# Patient Record
Sex: MALE | Race: WHITE | Employment: FULL TIME | URBAN - METROPOLITAN AREA
[De-identification: names, ages, dates, MRNs, and addresses within clinical notes are randomized per-mention and may not be internally consistent; named-entity substitution may affect disease eponyms.]

---

## 2017-05-02 ENCOUNTER — OFFICE VISIT (OUTPATIENT)
Dept: FAMILY MEDICINE CLINIC | Age: 47
End: 2017-05-02
Payer: COMMERCIAL

## 2017-05-02 VITALS — TEMPERATURE: 97.8 F | SYSTOLIC BLOOD PRESSURE: 136 MMHG | DIASTOLIC BLOOD PRESSURE: 84 MMHG

## 2017-05-02 DIAGNOSIS — J02.8 ACUTE PHARYNGITIS DUE TO OTHER SPECIFIED ORGANISMS: Primary | ICD-10-CM

## 2017-05-02 PROCEDURE — 99213 OFFICE O/P EST LOW 20 MIN: CPT | Performed by: FAMILY MEDICINE

## 2017-05-02 RX ORDER — AMOXICILLIN 500 MG/1
500 TABLET, FILM COATED ORAL 3 TIMES DAILY
Qty: 21 TABLET | Refills: 0 | Status: SHIPPED | OUTPATIENT
Start: 2017-05-02 | End: 2017-05-09

## 2017-05-02 ASSESSMENT — ENCOUNTER SYMPTOMS
COUGH: 0
SORE THROAT: 1
VOMITING: 0
NAUSEA: 0

## 2017-12-09 ENCOUNTER — HOSPITAL ENCOUNTER (EMERGENCY)
Age: 47
Discharge: HOME | End: 2017-12-09
Attending: FAMILY MEDICINE
Payer: COMMERCIAL

## 2017-12-09 ENCOUNTER — APPOINTMENT (OUTPATIENT)
Dept: GENERAL RADIOLOGY | Age: 47
End: 2017-12-09
Payer: COMMERCIAL

## 2017-12-09 VITALS
HEIGHT: 74 IN | DIASTOLIC BLOOD PRESSURE: 97 MMHG | BODY MASS INDEX: 33.75 KG/M2 | HEART RATE: 80 BPM | WEIGHT: 263 LBS | TEMPERATURE: 97.9 F | RESPIRATION RATE: 16 BRPM | OXYGEN SATURATION: 100 % | SYSTOLIC BLOOD PRESSURE: 175 MMHG

## 2017-12-09 DIAGNOSIS — T07.XXXA ABRASIONS OF MULTIPLE SITES: ICD-10-CM

## 2017-12-09 DIAGNOSIS — S43.101A AC SEPARATION, TYPE 3, RIGHT, INITIAL ENCOUNTER: Primary | ICD-10-CM

## 2017-12-09 PROCEDURE — 73030 X-RAY EXAM OF SHOULDER: CPT

## 2017-12-09 PROCEDURE — 99284 EMERGENCY DEPT VISIT MOD MDM: CPT

## 2017-12-09 RX ORDER — OXYCODONE HYDROCHLORIDE AND ACETAMINOPHEN 5; 325 MG/1; MG/1
1 TABLET ORAL EVERY 6 HOURS PRN
Qty: 10 TABLET | Refills: 0 | Status: SHIPPED | OUTPATIENT
Start: 2017-12-09 | End: 2017-12-16

## 2017-12-09 ASSESSMENT — PAIN DESCRIPTION - DESCRIPTORS: DESCRIPTORS: ACHING

## 2017-12-09 ASSESSMENT — PAIN DESCRIPTION - FREQUENCY: FREQUENCY: CONTINUOUS

## 2017-12-09 ASSESSMENT — PAIN SCALES - GENERAL
PAINLEVEL_OUTOF10: 4
PAINLEVEL_OUTOF10: 2

## 2017-12-09 ASSESSMENT — PAIN DESCRIPTION - LOCATION: LOCATION: SHOULDER

## 2017-12-09 ASSESSMENT — PAIN DESCRIPTION - ORIENTATION: ORIENTATION: RIGHT

## 2017-12-09 ASSESSMENT — PAIN DESCRIPTION - PAIN TYPE: TYPE: ACUTE PAIN

## 2017-12-09 NOTE — ED PROVIDER NOTES
eMERGENCY dEPARTMENT eNCOUnter        [address] [facility]    Chief Complaint   Patient presents with    Fall     8 steps    Facial Laceration     left eye area    Shoulder Injury     right states \"I think its out\"       HPI    Jerry Prajapati is a 50 y.o. male who presents With pain to his right shoulder and abrasions on his face and scalp after falling down 8 steps today. Patient describes pain in right shoulder is moderate in severity. There was no loss of consciousness. REVIEW OF SYSTEMS    All systems reviewed and positives are in the history of present illness. PAST MEDICAL HISTORY    Past Medical History:   Diagnosis Date    GERD (gastroesophageal reflux disease)     diverticulitis (2010)       SURGICAL HISTORY    Past Surgical History:   Procedure Laterality Date    COLONOSCOPY  10/10/14       CURRENT MEDICATIONS    Current Outpatient Rx   Medication Sig Dispense Refill    oxyCODONE-acetaminophen (PERCOCET) 5-325 MG per tablet Take 1 tablet by mouth every 6 hours as needed for Pain .  10 tablet 0       ALLERGIES    No Known Allergies    FAMILY HISTORY    Family History   Problem Relation Age of Onset    Hearing Loss Father     High Blood Pressure Father     Cancer Maternal Grandmother      colon    Cancer Paternal Grandmother      colon       SOCIAL HISTORY    Social History     Social History    Marital status: Single     Spouse name: N/A    Number of children: N/A    Years of education: N/A     Social History Main Topics    Smoking status: Never Smoker    Smokeless tobacco: Never Used    Alcohol use 0.0 oz/week     3 - 4 Cans of beer per week    Drug use: No    Sexual activity: Not Asked     Other Topics Concern    None     Social History Narrative    ** Merged History Encounter **            PHYSICAL EXAM    VITAL SIGNS: BP (!) 175/97   Pulse 80   Temp 97.9 °F (36.6 °C) (Oral)   Resp 16   Ht 6' 2\" (1.88 m)   Wt 263 lb (119.3 kg)   SpO2 100%   BMI 33.77 kg/m² 70 [facility] [address]    Call in 2 days          Final Impression:   1. AC separation, type 3, right, initial encounter    2. Abrasions of multiple sites               (Please note that portions of this note were completed with a voice recognition program.  Efforts were made to edit the dictations but occasionally words are mis-transcribed.)    2.          Juan Luis Pratt MD  12/09/17 [id number]

## 2017-12-09 NOTE — ED NOTES
Pt having difficulty finding position of comfort with sling.   Adjustments made and MD notified     Brianne Kelly, RN  12/09/17 [id number]

## 2018-01-24 ENCOUNTER — HOSPITAL ENCOUNTER (OUTPATIENT)
Age: 48
Discharge: HOME | End: 2018-01-24
Payer: COMMERCIAL

## 2018-01-24 ENCOUNTER — HOSPITAL ENCOUNTER (OUTPATIENT)
Dept: GENERAL RADIOLOGY | Age: 48
Discharge: HOME | End: 2018-01-24
Payer: COMMERCIAL

## 2018-01-24 DIAGNOSIS — S43.101S: ICD-10-CM

## 2018-01-24 PROCEDURE — 73030 X-RAY EXAM OF SHOULDER: CPT

## 2018-02-14 ENCOUNTER — HOSPITAL ENCOUNTER (OUTPATIENT)
Age: 48
Discharge: HOME | End: 2018-02-16
Payer: COMMERCIAL

## 2018-02-14 ENCOUNTER — HOSPITAL ENCOUNTER (OUTPATIENT)
Dept: GENERAL RADIOLOGY | Age: 48
Discharge: HOME | End: 2018-02-16
Payer: COMMERCIAL

## 2018-02-14 DIAGNOSIS — S43.51XD ACROMIOCLAVICULAR SPRAIN, RIGHT, SUBSEQUENT ENCOUNTER: ICD-10-CM

## 2018-02-14 PROCEDURE — 73030 X-RAY EXAM OF SHOULDER: CPT

## 2018-02-21 ENCOUNTER — HOSPITAL ENCOUNTER (OUTPATIENT)
Dept: PHYSICAL THERAPY | Age: 48
Setting detail: THERAPIES SERIES
Discharge: HOME | End: 2018-02-21
Payer: COMMERCIAL

## 2018-02-21 PROCEDURE — 97161 PT EVAL LOW COMPLEX 20 MIN: CPT

## 2018-02-21 NOTE — PRE-CERTIFICATION NOTE
F U N C T I O N A L   P R O G R E S S   C H A R T    Member: Franco Hall II    Member ID #    Diagnosis: Right Shoulder [facility] Joint Reconstruction 1/9/18 Insurance Company:   Referring Practitioner: Dr. Jing Saeed  Referring Physican ID#   Therapy Office:[facility]  Date fo Birth / Age: 1970/ 50 y.o.     [] Clinical Update Treating Clinician: Cristal Stark  ICD-9 (s):   Discipline:  []PT / []OT  Diagnosis:  [] Additional  Involved Side:  []Left / [] Right / [] N/A Date of Injury:  __/__/___       Visits Request       Date of Surgery:  __/__/___       Date:   2/21/2018 Date:   Total Number of Visits To Date Total # of Visits to Date: 1  (Total Requested: 6)    Pain Scale      /10 5/10 intermittent    Gait     AROM      AROM RUE (degrees)  R Shoulder Flexion 0-180: 106deg  R Shoulder ABduction 0-180: 85deg  R Shoulder Int Rotation  0-70: @45deg ABD=to stomach  R Shoulder Ext Rotation 0-90: @45 deg ABD=45deg                                        Strength                   Strength RUE  R Shoulder Flexion: 2+/5  R Shoulder ABduction: 2+/5  R Shoulder External Rotation: 2+/5  R Shoulder Horizontal ABduction: 2+/5          Proprioceptive/ Neurological Deficits     Functional Limitations / Additional Comments           Exam: UEFS=57/80      Copyright 2012 [employer]      Created: 9/99 / Revised 1/12

## 2018-02-21 NOTE — PLAN OF CARE
[facility]       Phone: [phone]   Date: 2018                      Outpatient Physical Therapy  Fax: [phone]    ACCT #: [de-identified]                     Plan of Care  [facility]#: [id number]  Patient: Marylene Brome II  : 1970    Referring Practitioner: Dr. Maggi Corrales    Referral Date : 18    Diagnosis: Right Shoulder AC Joint Reconstruction 18  Onset Date: 18  Treatment Diagnosis: R shoulder pain s/p [facility] reconstruction    Assessment  Body structures, Functions, Activity limitations: Decreased functional mobility , Decreased ADL status, Decreased ROM, Decreased strength, Decreased high-level IADLs, Decreased endurance  Assessment: Pt to benefit from ther ex for ROM and progress to strengthening. Pt current AROM: Flex=106deg, ABD=85deg, [email or web address] Pt issued HEP for AAROM, will follow up with pt on 3/6/18 to progress HEP. Prognosis: Good    Treatment Plan   Days: 1 times per week Weeks: 6 weeks Total # of Visits Approved: 6  [] HP/CP     [] Electrical Stimulation   [x]  Therapeutic Exercise   []  Gait Training  [] Traction   [] Ultrasound                   []  Massage                        []  Work Conditioning   [] Aquatics  [] Back Education            []  Therapeutic Activity [x]  Manual Therapy  [x]  Patient Education/HEP []  Anodyne Therapy     Goals  Short term goals  Time Frame for Short term goals: 3 visits  Short term goal 1: Pt to report independence and compliance with HEP. Short term goal 2: Pt to have 60deg of ER to improve pt ability to guerline coat. Long term goals  Time Frame for Long term goals : 6 visits  Long term goal 1: Pt to have AROM [id number] to allow pt to reach items from the cupboard without discomfort  Long term goal 2: Pt to score >67/80 on UEFS to improve ADL lo and work lo. Long term goal 3: Pt to complete 30# crate lift floor to waist to ensure pt able to lo work duties without difficulty.   Long term goal 4: Pt to      Guillermo Vieyra, PT     Date: 2/21/2018    ______________________________________ Date: 2/21/2018  Physician Signature  By signing above or cosigning electronically, I have reviewed this [address] [place] [address] and certify a need for medically necessary rehabilitation services.

## 2018-02-21 NOTE — PROGRESS NOTES
status, Decreased ROM, Decreased strength, Decreased high-level IADLs, Decreased endurance  Assessment: Pt to benefit from ther ex for ROM and progress to strengthening. Pt current AROM: [id number], ABD=85deg, Dennis@"[employer]. Pt issued HEP for SUKUMAR, will follow up with pt on 3/6/18 to progress HEP. Prognosis: Good  Decision Making: Low Complexity  Exam: UEFS=57/80    Clinical Presentation:  [x] Stable/Uncomplicated  [] Evolving [] Unstable/Unpredictable  The Following Comorbities will impact the patients progression and Plan of Care:   [] Diabetes    [] Stroke  [] Cardiac Disease/Pacemaker [x] Previous Orthopedic Injury/Surgery  [] Seizure Disorder   [] WB Restrictions  [] Obesity    [] Neuropathy     Activity Tolerance: Patient Tolerated treatment well    Education:   Patient Education: POC; HEP per Exercise Log  Barriers to Learning: None    Goals  Short term goals  Time Frame for Short term goals: 3 visits  Short term goal 1: Pt to report independence and compliance with HEP. Short term goal 2: Pt to have 60deg of ER to improve pt ability to guerline coat. Long term goals  Time Frame for Long term goals : 6 visits  Long term goal 1: Pt to have AROM [id number] to allow pt to reach items from the cupboard without discomfort  Long term goal 2: Pt to score >67/80 on UEFS to improve ADL lo and work lo. Long term goal 3: Pt to complete 30# crate lift floor to waist to ensure pt able to lo work duties without difficulty. Long term goal 4: Pt to       Patient goals : Be able to return to work and recreational activities.     Timed Code Treatment Minutes: 15 Minutes  Total Treatment Time: 55  Time In: 6123  Time Out: 100 Venus Bailey  2/21/2018

## 2020-01-31 ENCOUNTER — NURSE ONLY (OUTPATIENT)
Dept: PRIMARY CARE CLINIC | Age: 50
End: 2020-01-31
Payer: COMMERCIAL

## 2020-01-31 PROCEDURE — 90715 TDAP VACCINE 7 YRS/> IM: CPT | Performed by: NURSE PRACTITIONER

## 2020-01-31 PROCEDURE — 90472 IMMUNIZATION ADMIN EACH ADD: CPT | Performed by: NURSE PRACTITIONER

## 2020-01-31 PROCEDURE — 90471 IMMUNIZATION ADMIN: CPT | Performed by: NURSE PRACTITIONER

## 2020-01-31 PROCEDURE — 90686 IIV4 VACC NO PRSV 0.5 ML IM: CPT | Performed by: NURSE PRACTITIONER

## 2020-01-31 NOTE — PROGRESS NOTES
After obtaining consent, and per orders of Yi REED, injection of Boostrix given in Right deltoid by Fawn Sadler. Patient instructed to remain in clinic for 20 minutes afterwards, and to report any adverse reaction to me immediately. Vaccine Information Sheet, \"Influenza - Inactivated\"  given to Virginie Shook, or parent/legal guardian of  Virginie Shook and verbalized understanding. Patient responses:    Have you ever had a reaction to a flu vaccine? Patient states he got sick after getting a flu shot many years ago. Are you able to eat eggs without adverse effects? Yes  Do you have any current illness? No  Have you ever had Guillian Laddonia Syndrome? No    Flu vaccine given per order. Please see immunization tab.

## 2022-01-19 ENCOUNTER — HOSPITAL ENCOUNTER (OUTPATIENT)
Dept: PREADMISSION TESTING | Age: 52
Setting detail: SPECIMEN
Discharge: HOME | End: 2022-01-19
Payer: COMMERCIAL

## 2022-01-19 ENCOUNTER — TELEPHONE (OUTPATIENT)
Dept: FAMILY MEDICINE CLINIC | Age: 52
End: 2022-01-19

## 2022-01-19 DIAGNOSIS — Z20.822 SUSPECTED COVID-19 VIRUS INFECTION: ICD-10-CM

## 2022-01-19 DIAGNOSIS — Z20.822 SUSPECTED COVID-19 VIRUS INFECTION: Primary | ICD-10-CM

## 2022-01-19 LAB
SARS-COV-2, RAPID: NOT DETECTED
SPECIMEN DESCRIPTION: NORMAL

## 2022-01-19 PROCEDURE — C9803 HOPD COVID-19 SPEC COLLECT: HCPCS

## 2022-01-19 PROCEDURE — 87635 SARS-COV-2 COVID-19 AMP PRB: CPT

## 2022-01-19 NOTE — TELEPHONE ENCOUNTER
Yuri Delaney is c/o having a bad sore throat and body aches. He said he has had this for a couple days. He has had both COVID shots not the booster. Can he get tested? Health Maintenance   Topic Date Due    Hepatitis C screen  Never done    COVID-19 Vaccine (1) Never done    Depression Screen  Never done    HIV screen  Never done    Lipid screen  Never done    Colon cancer screen colonoscopy  Never done    Shingles Vaccine (1 of 2) Never done    Flu vaccine (1) 09/01/2021    DTaP/Tdap/Td vaccine (3 - Td or Tdap) 01/31/2030    Hepatitis A vaccine  Aged Out    Hepatitis B vaccine  Aged Out    Hib vaccine  Aged Out    Meningococcal (ACWY) vaccine  Aged Out    Pneumococcal 0-64 years Vaccine  Aged Out             (applicable per patient's age: Cancer Screenings, Depression Screening, Fall Risk Screening, Immunizations)    AST (U/L)   Date Value   05/08/2013 42 (H)     ALT (U/L)   Date Value   05/08/2013 95 (H)     BUN (mg/dL)   Date Value   05/08/2013 26 (H)      (goal A1C is < 7)   (goal LDL is <100) need 30-50% reduction from baseline     BP Readings from Last 3 Encounters:   12/09/17 (!) 175/97   05/02/17 136/84   11/08/16 134/84    (goal /80)      All Future Testing planned in [facility]:  Lab Frequency Next Occurrence       Next Visit Date:  No future appointments.          Patient Active Problem List:     Closed C1 fracture (HCC)     GERD (gastroesophageal reflux disease)     Neck pain on left side     Headache due to trauma     C1 cervical fracture (HCC)     Pain in neck     Weakness

## 2022-01-20 ENCOUNTER — OFFICE VISIT (OUTPATIENT)
Dept: FAMILY MEDICINE CLINIC | Age: 52
End: 2022-01-20
Payer: COMMERCIAL

## 2022-01-20 VITALS
SYSTOLIC BLOOD PRESSURE: 150 MMHG | DIASTOLIC BLOOD PRESSURE: 92 MMHG | BODY MASS INDEX: 35.68 KG/M2 | TEMPERATURE: 99.1 F | HEIGHT: 74 IN | WEIGHT: 278 LBS

## 2022-01-20 DIAGNOSIS — J03.80 ACUTE TONSILLITIS DUE TO OTHER SPECIFIED ORGANISMS: Primary | ICD-10-CM

## 2022-01-20 PROCEDURE — 99213 OFFICE O/P EST LOW 20 MIN: CPT | Performed by: FAMILY MEDICINE

## 2022-01-20 RX ORDER — AMOXICILLIN 500 MG/1
500 CAPSULE ORAL 3 TIMES DAILY
Qty: 21 CAPSULE | Refills: 0 | Status: SHIPPED | OUTPATIENT
Start: 2022-01-20 | End: 2022-02-02 | Stop reason: SDUPTHER

## 2022-01-20 SDOH — ECONOMIC STABILITY: FOOD INSECURITY: WITHIN THE PAST 12 MONTHS, THE FOOD YOU BOUGHT JUST DIDN'T LAST AND YOU DIDN'T HAVE MONEY TO GET MORE.: NEVER TRUE

## 2022-01-20 SDOH — ECONOMIC STABILITY: FOOD INSECURITY: WITHIN THE PAST 12 MONTHS, YOU WORRIED THAT YOUR FOOD WOULD RUN OUT BEFORE YOU GOT MONEY TO BUY MORE.: NEVER TRUE

## 2022-01-20 ASSESSMENT — ENCOUNTER SYMPTOMS
SWOLLEN GLANDS: 1
EYE REDNESS: 0
COUGH: 0
SORE THROAT: 1
NAUSEA: 0
VOMITING: 0
DIARRHEA: 0
EYE DISCHARGE: 0

## 2022-01-20 ASSESSMENT — PATIENT HEALTH QUESTIONNAIRE - PHQ9
2. FEELING DOWN, DEPRESSED OR HOPELESS: 0
SUM OF ALL RESPONSES TO PHQ QUESTIONS 1-9: 0
SUM OF ALL RESPONSES TO PHQ9 QUESTIONS 1 & 2: 0
SUM OF ALL RESPONSES TO PHQ QUESTIONS 1-9: 0
1. LITTLE INTEREST OR PLEASURE IN DOING THINGS: 0

## 2022-01-20 ASSESSMENT — SOCIAL DETERMINANTS OF HEALTH (SDOH): HOW HARD IS IT FOR YOU TO PAY FOR THE VERY BASICS LIKE FOOD, HOUSING, MEDICAL CARE, AND HEATING?: NOT HARD AT ALL

## 2022-01-20 NOTE — PROGRESS NOTES
HPI Notes    Name: Kerry Hurtado  : 1970        Chief Complaint:     Chief Complaint   Patient presents with    Pharyngitis     Pt c/o sore throat over the past couple days. Pt negative for covid yesterday. Pt taking Mucinex for c/o sore throat. History of Present Illness:     Kerry Hurtado is a 46 y.o.  male who presents with Pharyngitis (Pt c/o sore throat over the past couple days. Pt negative for covid yesterday. Pt taking Mucinex for c/o sore throat.)      Pharyngitis  This is a new problem. Episode onset: started 2d ago with sore throat and the throat is worse today. Pt had low grade temp. pt feel like his throat is strep throat that he has had before. The problem occurs daily (pt states he had a negative COVID test here yesterday. ). The problem has been gradually worsening. Associated symptoms include congestion, a fever, a sore throat and swollen glands. Pertinent negatives include no chills, coughing, myalgias, nausea, rash or vomiting. Associated symptoms comments: No ear pain. Treatments tried: alleve and mucinex last night. Past Medical History:     Past Medical History:   Diagnosis Date    GERD (gastroesophageal reflux disease)     diverticulitis ()      Reviewed all health maintenance requirements and ordered appropriate tests  Health Maintenance Due   Topic Date Due    Hepatitis C screen  Never done    Depression Screen  Never done    HIV screen  Never done    Diabetes screen  Never done    Lipid screen  Never done    Colon cancer screen colonoscopy  Never done    Shingles Vaccine (1 of 2) Never done    Flu vaccine (1) 2021    COVID-19 Vaccine (3 - Booster for [facility] series) 2021       Past Surgical History:     Past Surgical History:   Procedure Laterality Date    COLONOSCOPY  10/10/14        Medications:       Prior to Admission medications    Medication Sig Start Date End Date Taking?  Authorizing Provider   amoxicillin (AMOXIL) 500 MG capsule Take 1 capsule by mouth 3 times daily for 7 days 1/20/22 1/27/22 Yes Lorene Garg MD        Allergies:       Patient has no known allergies. Social History:     Tobacco:    reports that he has never smoked. He has never used smokeless tobacco.  Alcohol:      reports current alcohol use. Drug Use:  reports no history of drug use. Family History:     Family History   Problem Relation Age of Onset    Hearing Loss Father     High Blood Pressure Father     Cancer Maternal Grandmother         colon    Cancer Paternal Grandmother         colon       Review of Systems:       Review of Systems   Constitutional: Positive for fever. Negative for chills. HENT: Positive for congestion and sore throat. Eyes: Negative for discharge and redness. Respiratory: Negative for cough. Gastrointestinal: Negative for diarrhea, nausea and vomiting. Musculoskeletal: Negative for myalgias. Skin: Negative for rash. Physical Exam:     Physical Exam  Vitals reviewed. Constitutional:       General: He is not in acute distress. Appearance: Normal appearance. He is not ill-appearing. HENT:      Head: Normocephalic and atraumatic. Right Ear: Tympanic membrane normal.      Left Ear: Tympanic membrane normal.      Mouth/Throat:      Pharynx: Pharyngeal swelling and posterior oropharyngeal erythema present. No oropharyngeal exudate. Tonsils: No tonsillar abscesses. 2+ on the right. 2+ on the left. Eyes:      General:         Right eye: No discharge. Left eye: No discharge. Lymphadenopathy:      Cervical: Cervical adenopathy present. Neurological:      Mental Status: He is alert.          Vitals:  BP (!) 150/92 (Site: Left Upper Arm, Cuff Size: Large Adult)   Temp 99.1 °F (37.3 °C) (Oral)   Ht 6' 2\" (1.88 m)   Wt 278 lb (126.1 kg)   BMI 35.69 kg/m²       Data:     Lab Results   Component Value Date     05/08/2013    K 4.4 05/08/2013     05/08/2013    CO2 29 05/08/2013    BUN 26 05/08/2013    CREATININE 1.07 05/08/2013    GLUCOSE 100 05/08/2013    PROT 7.6 05/08/2013    LABALBU 4.7 05/08/2013    BILITOT 0.56 05/08/2013    ALKPHOS 75 05/08/2013    AST 42 05/08/2013    ALT 95 05/08/2013     Lab Results   Component Value Date    WBC 7.5 05/08/2013    RBC 5.41 05/08/2013    HGB 15.7 05/08/2013    HCT 47.5 05/08/2013    MCV 87.8 05/08/2013    MCH 29.1 05/08/2013    MCHC 33.2 05/08/2013    RDW 13.2 05/08/2013     05/08/2013    MPV NOT REPORTED 05/08/2013     No results found for: TSH  No results found for: CHOL, LDL, HDL, PSA, LABA1C       Assessment/Plan:        1. Acute tonsillitis due to other specified organisms  Take all antibiotics, increase rest and fluids. F/U 4-5d if not better or sooner if worse. All questions answered. Pt to try warm salt water gargle, tea with lemon or honey etc.         Return if symptoms worsen or fail to improve.       Electronically signed by Monique Paz MD on 1/20/2022 at 11:24 AM

## 2022-02-02 ENCOUNTER — TELEPHONE (OUTPATIENT)
Dept: PRIMARY CARE CLINIC | Age: 52
End: 2022-02-02

## 2022-02-02 RX ORDER — AMOXICILLIN 500 MG/1
500 CAPSULE ORAL 3 TIMES DAILY
Qty: 21 CAPSULE | Refills: 0 | Status: SHIPPED | OUTPATIENT
Start: 2022-02-02 | End: 2022-02-09

## 2022-02-02 NOTE — TELEPHONE ENCOUNTER
Lab reported CO2 41, perfect served Dr Benavidez with results, will continue to monitor.   ----- Message from Alyson Je sent at 2/2/2022 12:05 PM EST -----  Subject: Refill Request    QUESTIONS  Name of Medication? amoxicillin (AMOXIL) 500 MG capsule  Patient-reported dosage and instructions? Take 1 capsule by mouth 3 times   daily for 7 days  How many days do you have left? 0  Preferred Pharmacy? [facility] [address] [facility] phone number (if available)? [phone]  Additional Information for Provider? Patient feel like sore throat is   coming back , requesting refill for medication.   ---------------------------------------------------------------------------  --------------  CALL BACK INFO  What is the best way for the office to contact you?  OK to leave message on   voicemail  Preferred Call Back Phone Number? [phone]

## 2022-02-02 NOTE — TELEPHONE ENCOUNTER
Spoke with pt, he stated the he took all the medication and it cleared up his sore throat. Today he has a new onset of sore throat, and would like a refill on the ABX. Please advise.